# Patient Record
Sex: MALE | Race: OTHER | URBAN - METROPOLITAN AREA
[De-identification: names, ages, dates, MRNs, and addresses within clinical notes are randomized per-mention and may not be internally consistent; named-entity substitution may affect disease eponyms.]

---

## 2017-03-02 ENCOUNTER — EMERGENCY (EMERGENCY)
Facility: HOSPITAL | Age: 32
LOS: 0 days | Discharge: HOME | End: 2017-03-03
Attending: EMERGENCY MEDICINE | Admitting: EMERGENCY MEDICINE
Payer: MEDICAID

## 2017-03-02 VITALS
WEIGHT: 149.91 LBS | HEIGHT: 66 IN | HEART RATE: 61 BPM | SYSTOLIC BLOOD PRESSURE: 133 MMHG | OXYGEN SATURATION: 100 % | TEMPERATURE: 98 F | RESPIRATION RATE: 15 BRPM | DIASTOLIC BLOOD PRESSURE: 97 MMHG

## 2017-03-02 LAB
ALBUMIN SERPL ELPH-MCNC: 4 G/DL — SIGNIFICANT CHANGE UP (ref 3.3–5)
ALP SERPL-CCNC: 101 U/L — SIGNIFICANT CHANGE UP (ref 40–120)
ALT FLD-CCNC: 48 U/L — SIGNIFICANT CHANGE UP (ref 12–78)
ANION GAP SERPL CALC-SCNC: 7 MMOL/L — SIGNIFICANT CHANGE UP (ref 5–17)
APTT BLD: 29.9 SEC — SIGNIFICANT CHANGE UP (ref 27.5–37.4)
AST SERPL-CCNC: 21 U/L — SIGNIFICANT CHANGE UP (ref 15–37)
BASOPHILS # BLD AUTO: 0.1 K/UL — SIGNIFICANT CHANGE UP (ref 0–0.2)
BASOPHILS NFR BLD AUTO: 0.8 % — SIGNIFICANT CHANGE UP (ref 0–2)
BILIRUB SERPL-MCNC: 0.3 MG/DL — SIGNIFICANT CHANGE UP (ref 0.2–1.2)
BUN SERPL-MCNC: 15 MG/DL — SIGNIFICANT CHANGE UP (ref 7–23)
CALCIUM SERPL-MCNC: 9 MG/DL — SIGNIFICANT CHANGE UP (ref 8.5–10.1)
CHLORIDE SERPL-SCNC: 107 MMOL/L — SIGNIFICANT CHANGE UP (ref 96–108)
CO2 SERPL-SCNC: 28 MMOL/L — SIGNIFICANT CHANGE UP (ref 22–31)
CREAT SERPL-MCNC: 0.94 MG/DL — SIGNIFICANT CHANGE UP (ref 0.5–1.3)
D DIMER BLD IA.RAPID-MCNC: <150 NG/ML DDU — SIGNIFICANT CHANGE UP
EOSINOPHIL # BLD AUTO: 0.2 K/UL — SIGNIFICANT CHANGE UP (ref 0–0.5)
EOSINOPHIL NFR BLD AUTO: 1.7 % — SIGNIFICANT CHANGE UP (ref 0–6)
GLUCOSE SERPL-MCNC: 100 MG/DL — HIGH (ref 70–99)
HCT VFR BLD CALC: 48 % — SIGNIFICANT CHANGE UP (ref 39–50)
HGB BLD-MCNC: 15.9 G/DL — SIGNIFICANT CHANGE UP (ref 13–17)
INR BLD: 1.1 RATIO — SIGNIFICANT CHANGE UP (ref 0.88–1.16)
LIDOCAIN IGE QN: 273 U/L — SIGNIFICANT CHANGE UP (ref 73–393)
LYMPHOCYTES # BLD AUTO: 36.6 % — SIGNIFICANT CHANGE UP (ref 13–44)
LYMPHOCYTES # BLD AUTO: 4.1 K/UL — HIGH (ref 1–3.3)
MCHC RBC-ENTMCNC: 29.1 PG — SIGNIFICANT CHANGE UP (ref 27–34)
MCHC RBC-ENTMCNC: 33.2 GM/DL — SIGNIFICANT CHANGE UP (ref 32–36)
MCV RBC AUTO: 87.6 FL — SIGNIFICANT CHANGE UP (ref 80–100)
MONOCYTES # BLD AUTO: 1 K/UL — HIGH (ref 0–0.9)
MONOCYTES NFR BLD AUTO: 9.3 % — SIGNIFICANT CHANGE UP (ref 2–14)
NEUTROPHILS # BLD AUTO: 5.7 K/UL — SIGNIFICANT CHANGE UP (ref 1.8–7.4)
NEUTROPHILS NFR BLD AUTO: 51.5 % — SIGNIFICANT CHANGE UP (ref 43–77)
PLATELET # BLD AUTO: 319 K/UL — SIGNIFICANT CHANGE UP (ref 150–400)
POTASSIUM SERPL-MCNC: 3.9 MMOL/L — SIGNIFICANT CHANGE UP (ref 3.5–5.3)
POTASSIUM SERPL-SCNC: 3.9 MMOL/L — SIGNIFICANT CHANGE UP (ref 3.5–5.3)
PROT SERPL-MCNC: 7.7 GM/DL — SIGNIFICANT CHANGE UP (ref 6–8.3)
PROTHROM AB SERPL-ACNC: 12.1 SEC — SIGNIFICANT CHANGE UP (ref 10–13.1)
RBC # BLD: 5.48 M/UL — SIGNIFICANT CHANGE UP (ref 4.2–5.8)
RBC # FLD: 12.3 % — SIGNIFICANT CHANGE UP (ref 10.3–14.5)
SODIUM SERPL-SCNC: 142 MMOL/L — SIGNIFICANT CHANGE UP (ref 135–145)
TROPONIN I SERPL-MCNC: <0.015 NG/ML — SIGNIFICANT CHANGE UP (ref 0.01–0.04)
WBC # BLD: 11.1 K/UL — HIGH (ref 3.8–10.5)
WBC # FLD AUTO: 11.1 K/UL — HIGH (ref 3.8–10.5)

## 2017-03-02 PROCEDURE — 99284 EMERGENCY DEPT VISIT MOD MDM: CPT

## 2017-03-02 PROCEDURE — 71020: CPT | Mod: 26

## 2017-03-02 PROCEDURE — 93010 ELECTROCARDIOGRAM REPORT: CPT

## 2017-03-02 RX ORDER — SODIUM CHLORIDE 9 MG/ML
3 INJECTION INTRAMUSCULAR; INTRAVENOUS; SUBCUTANEOUS ONCE
Qty: 0 | Refills: 0 | Status: COMPLETED | OUTPATIENT
Start: 2017-03-02 | End: 2017-03-02

## 2017-03-02 RX ADMIN — SODIUM CHLORIDE 3 MILLILITER(S): 9 INJECTION INTRAMUSCULAR; INTRAVENOUS; SUBCUTANEOUS at 23:57

## 2017-03-02 NOTE — ED PROVIDER NOTE - OBJECTIVE STATEMENT
32 yo M with hx of HTN presents for eval of right sided chest pain x 3 days. pain poorly characterized, intermittent, associated with shortness of breath, not associated with vomitng or diaphoresis. pain not worsened by food, not associated with food.

## 2017-03-02 NOTE — ED ADULT TRIAGE NOTE - CHIEF COMPLAINT QUOTE
brathing problems x 2 days. Denies chest pain. Denies significant med hx. no OTC meds tried at home. Has not seen MD. no acute distress noted at triage desk.

## 2017-03-02 NOTE — ED ADULT NURSE NOTE - CHPI ED SYMPTOMS NEG
no dizziness/no fever/no vomiting/no nausea/no tingling/no weakness/no chills/no decreased eating/drinking

## 2017-03-03 VITALS
SYSTOLIC BLOOD PRESSURE: 125 MMHG | HEART RATE: 71 BPM | DIASTOLIC BLOOD PRESSURE: 73 MMHG | TEMPERATURE: 98 F | OXYGEN SATURATION: 100 % | RESPIRATION RATE: 17 BRPM

## 2017-03-03 PROCEDURE — 76705 ECHO EXAM OF ABDOMEN: CPT | Mod: 26

## 2017-03-04 DIAGNOSIS — I10 ESSENTIAL (PRIMARY) HYPERTENSION: ICD-10-CM

## 2017-03-04 DIAGNOSIS — R07.89 OTHER CHEST PAIN: ICD-10-CM

## 2022-03-13 ENCOUNTER — EMERGENCY (EMERGENCY)
Facility: HOSPITAL | Age: 37
LOS: 0 days | Discharge: HOME | End: 2022-03-13
Attending: EMERGENCY MEDICINE
Payer: MEDICAID

## 2022-03-13 VITALS — HEIGHT: 66 IN

## 2022-03-13 VITALS
RESPIRATION RATE: 18 BRPM | HEART RATE: 84 BPM | DIASTOLIC BLOOD PRESSURE: 95 MMHG | OXYGEN SATURATION: 99 % | TEMPERATURE: 98 F | SYSTOLIC BLOOD PRESSURE: 148 MMHG

## 2022-03-13 DIAGNOSIS — R20.2 PARESTHESIA OF SKIN: ICD-10-CM

## 2022-03-13 DIAGNOSIS — K45.8 OTHER SPECIFIED ABDOMINAL HERNIA WITHOUT OBSTRUCTION OR GANGRENE: ICD-10-CM

## 2022-03-13 DIAGNOSIS — M54.10 RADICULOPATHY, SITE UNSPECIFIED: ICD-10-CM

## 2022-03-13 DIAGNOSIS — M79.605 PAIN IN LEFT LEG: ICD-10-CM

## 2022-03-13 DIAGNOSIS — M54.2 CERVICALGIA: ICD-10-CM

## 2022-03-13 DIAGNOSIS — I10 ESSENTIAL (PRIMARY) HYPERTENSION: ICD-10-CM

## 2022-03-13 PROCEDURE — 93971 EXTREMITY STUDY: CPT | Mod: 26,LT

## 2022-03-13 PROCEDURE — 93971 EXTREMITY STUDY: CPT | Mod: LT

## 2022-03-13 PROCEDURE — 72131 CT LUMBAR SPINE W/O DYE: CPT | Mod: 26,MA

## 2022-03-13 PROCEDURE — 72131 CT LUMBAR SPINE W/O DYE: CPT | Mod: MA

## 2022-03-13 PROCEDURE — 96372 THER/PROPH/DIAG INJ SC/IM: CPT

## 2022-03-13 PROCEDURE — 99285 EMERGENCY DEPT VISIT HI MDM: CPT

## 2022-03-13 PROCEDURE — 99284 EMERGENCY DEPT VISIT MOD MDM: CPT | Mod: 25

## 2022-03-13 RX ORDER — KETOROLAC TROMETHAMINE 30 MG/ML
30 SYRINGE (ML) INJECTION ONCE
Refills: 0 | Status: DISCONTINUED | OUTPATIENT
Start: 2022-03-13 | End: 2022-03-13

## 2022-03-13 RX ORDER — SODIUM CHLORIDE 9 MG/ML
1000 INJECTION INTRAMUSCULAR; INTRAVENOUS; SUBCUTANEOUS ONCE
Refills: 0 | Status: DISCONTINUED | OUTPATIENT
Start: 2022-03-13 | End: 2022-03-13

## 2022-03-13 RX ADMIN — Medication 30 MILLIGRAM(S): at 11:10

## 2022-03-13 NOTE — ED STATDOCS - NSFOLLOWUPINSTRUCTIONS_ED_ALL_ED_FT
Follow up with your primary care doctor and with a spine doctor for further evaluation of the pain.  Continue the pain medication that your doctor gave you.  Avoid heavy lifting or bending.     Return to the Er for any new or other concerns.

## 2022-03-13 NOTE — ED STATDOCS - PROGRESS NOTE DETAILS
35 yo male with a no significant PMH presents with LLE pain. Pt states approx 1 week ago he noticed R sided neck pain and RLE pain, was seen by his PMD, and was prescribed methocarbamol and naproxen which helped. The RLE pain resolved and then the pain traveled to the LLE. Feels the pain from the L thigh to the L calf and described as tingling. Denies fever, back pain, injuries or trauma. Works as a . Will check sono LLE and CT L spine. -Alexx Gomez PA-C Ct showing mild herniation on the R L5 nerve root and unremakrable sono. Pt feels better after the toradol. Advised him to continue the naproxen and muscle relaxer that he was given and to f/u with pmd and will give spine f/u. -Alexx Gomez PA-C

## 2022-03-13 NOTE — ED STATDOCS - PATIENT PORTAL LINK FT
You can access the FollowMyHealth Patient Portal offered by [facility] by registering at the following website: [email or web address]. By joining [employer]’s FollowMyHealth portal, you will also be able to view your health information using other applications (apps) compatible with our system.

## 2022-03-13 NOTE — ED STATDOCS - CARE PROVIDER_API CALL
Hernán Raya; PhD)  Neurosurgery  [address], [address] [place] [address]  Phone: [phone]  Fax: [phone]  Follow Up Time:

## 2022-03-13 NOTE — ED ADULT NURSE NOTE - CAS ELECT INFOMATION PROVIDED
pt discharged, results explained by MIGEL Gomez, refused [place]  for discharge, but it was offered./DC instructions

## 2022-03-13 NOTE — ED STATDOCS - NEUROLOGICAL, MLM
LLE with normal sensory and motor exam, no deficits; patellar, achilles reflexes 2+, Babinski negative.

## 2022-03-13 NOTE — ED STATDOCS - CLINICAL SUMMARY MEDICAL DECISION MAKING FREE TEXT BOX
37 yo male presents with LLE pain described as tingling. Not improving with muscle relaxers and nsaids. WIll check sono and CT and reeval. -Alexx Gomez PA-C

## 2022-03-13 NOTE — ED STATDOCS - OBJECTIVE STATEMENT
37 yo M no significant PMHx presents with CC of left leg pain, paresthesias.  Symptoms x 2 days.  C/o stinging pain, tingling sensation from left buttock down left leg.  Denies weakness, difficulty walking.  States he had been having right neck pain a few days ago and was treated with NSAID and muscle relaxer.  those symptoms improved but now having new LLE symptoms.  Denies trauma, back pain, incontinence, or any other symptoms.  No other concerns.

## 2022-07-28 NOTE — ED ADULT TRIAGE NOTE - AS HEIGHT TYPE
Rapid3 Question Responses and Scores 7/27/2022   MDHAQ Score 0.2   Psychologic Score 0   Pain Score 2.5   When you awakened in the morning OVER THE LAST WEEK, did you feel stiff? No   If Yes, please indicate the number of hours until you are as limber as you will be for the day 1   Fatigue Score 2   Global Health Score 3   RAPID3 Score 2.06       Answers for HPI/ROS submitted by the patient on 7/27/2022  fever: No  eye redness: Yes  mouth sores: No  headaches: No  shortness of breath: No  chest pain: No  trouble swallowing: No  diarrhea: No  constipation: No  unexpected weight change: No  genital sore: No  dysuria: No  During the last 3 days, have you had a skin rash?: No  Bruises or bleeds easily: No  cough: No       stated

## 2024-02-20 ENCOUNTER — EMERGENCY (EMERGENCY)
Facility: HOSPITAL | Age: 39
LOS: 0 days | Discharge: HOME | End: 2024-02-20
Attending: STUDENT IN AN ORGANIZED HEALTH CARE EDUCATION/TRAINING PROGRAM
Payer: MEDICAID

## 2024-02-20 VITALS — WEIGHT: 184.09 LBS | HEIGHT: 68 IN

## 2024-02-20 VITALS
OXYGEN SATURATION: 100 % | HEART RATE: 80 BPM | DIASTOLIC BLOOD PRESSURE: 82 MMHG | RESPIRATION RATE: 16 BRPM | SYSTOLIC BLOOD PRESSURE: 126 MMHG | TEMPERATURE: 98 F

## 2024-02-20 DIAGNOSIS — Z91.148 PATIENT'S OTHER NONCOMPLIANCE WITH MEDICATION REGIMEN FOR OTHER REASON: ICD-10-CM

## 2024-02-20 DIAGNOSIS — Z79.84 LONG TERM (CURRENT) USE OF ORAL HYPOGLYCEMIC DRUGS: ICD-10-CM

## 2024-02-20 DIAGNOSIS — R10.9 UNSPECIFIED ABDOMINAL PAIN: ICD-10-CM

## 2024-02-20 DIAGNOSIS — E11.65 TYPE 2 DIABETES MELLITUS WITH HYPERGLYCEMIA: ICD-10-CM

## 2024-02-20 DIAGNOSIS — R53.83 OTHER FATIGUE: ICD-10-CM

## 2024-02-20 DIAGNOSIS — I10 ESSENTIAL (PRIMARY) HYPERTENSION: ICD-10-CM

## 2024-02-20 DIAGNOSIS — R73.9 HYPERGLYCEMIA, UNSPECIFIED: ICD-10-CM

## 2024-02-20 LAB
ACETONE SERPL-MCNC: NEGATIVE — SIGNIFICANT CHANGE UP
ALBUMIN SERPL ELPH-MCNC: 3.7 G/DL — SIGNIFICANT CHANGE UP (ref 3.3–5)
ALP SERPL-CCNC: 109 U/L — SIGNIFICANT CHANGE UP (ref 40–120)
ALT FLD-CCNC: 86 U/L — HIGH (ref 12–78)
ANION GAP SERPL CALC-SCNC: 6 MMOL/L — SIGNIFICANT CHANGE UP (ref 5–17)
APPEARANCE UR: CLEAR — SIGNIFICANT CHANGE UP
AST SERPL-CCNC: 38 U/L — HIGH (ref 15–37)
BASE EXCESS BLDV CALC-SCNC: -0.3 MMOL/L — SIGNIFICANT CHANGE UP (ref -2–3)
BASOPHILS # BLD AUTO: 0.05 K/UL — SIGNIFICANT CHANGE UP (ref 0–0.2)
BASOPHILS NFR BLD AUTO: 0.6 % — SIGNIFICANT CHANGE UP (ref 0–2)
BILIRUB SERPL-MCNC: 0.4 MG/DL — SIGNIFICANT CHANGE UP (ref 0.2–1.2)
BILIRUB UR-MCNC: NEGATIVE — SIGNIFICANT CHANGE UP
BUN SERPL-MCNC: 14 MG/DL — SIGNIFICANT CHANGE UP (ref 7–23)
CALCIUM SERPL-MCNC: 9.5 MG/DL — SIGNIFICANT CHANGE UP (ref 8.5–10.1)
CHLORIDE SERPL-SCNC: 106 MMOL/L — SIGNIFICANT CHANGE UP (ref 96–108)
CO2 SERPL-SCNC: 28 MMOL/L — SIGNIFICANT CHANGE UP (ref 22–31)
COLOR SPEC: YELLOW — SIGNIFICANT CHANGE UP
CREAT SERPL-MCNC: 1.4 MG/DL — HIGH (ref 0.5–1.3)
DIFF PNL FLD: NEGATIVE — SIGNIFICANT CHANGE UP
EGFR: 65 ML/MIN/1.73M2 — SIGNIFICANT CHANGE UP
EOSINOPHIL # BLD AUTO: 0.14 K/UL — SIGNIFICANT CHANGE UP (ref 0–0.5)
EOSINOPHIL NFR BLD AUTO: 1.6 % — SIGNIFICANT CHANGE UP (ref 0–6)
GAS PNL BLDV: SIGNIFICANT CHANGE UP
GLUCOSE BLDC GLUCOMTR-MCNC: 261 MG/DL — HIGH (ref 70–99)
GLUCOSE BLDC GLUCOMTR-MCNC: 307 MG/DL — HIGH (ref 70–99)
GLUCOSE SERPL-MCNC: 429 MG/DL — HIGH (ref 70–99)
GLUCOSE UR QL: >=1000 MG/DL
HCO3 BLDV-SCNC: 24 MMOL/L — SIGNIFICANT CHANGE UP (ref 22–29)
HCT VFR BLD CALC: 46.9 % — SIGNIFICANT CHANGE UP (ref 39–50)
HGB BLD-MCNC: 15.8 G/DL — SIGNIFICANT CHANGE UP (ref 13–17)
IMM GRANULOCYTES NFR BLD AUTO: 0.2 % — SIGNIFICANT CHANGE UP (ref 0–0.9)
KETONES UR-MCNC: ABNORMAL MG/DL
LEUKOCYTE ESTERASE UR-ACNC: NEGATIVE — SIGNIFICANT CHANGE UP
LIDOCAIN IGE QN: 65 U/L — SIGNIFICANT CHANGE UP (ref 13–75)
LYMPHOCYTES # BLD AUTO: 2.75 K/UL — SIGNIFICANT CHANGE UP (ref 1–3.3)
LYMPHOCYTES # BLD AUTO: 31.1 % — SIGNIFICANT CHANGE UP (ref 13–44)
MAGNESIUM SERPL-MCNC: 2.3 MG/DL — SIGNIFICANT CHANGE UP (ref 1.6–2.6)
MCHC RBC-ENTMCNC: 29.3 PG — SIGNIFICANT CHANGE UP (ref 27–34)
MCHC RBC-ENTMCNC: 33.7 GM/DL — SIGNIFICANT CHANGE UP (ref 32–36)
MCV RBC AUTO: 87 FL — SIGNIFICANT CHANGE UP (ref 80–100)
MONOCYTES # BLD AUTO: 0.71 K/UL — SIGNIFICANT CHANGE UP (ref 0–0.9)
MONOCYTES NFR BLD AUTO: 8 % — SIGNIFICANT CHANGE UP (ref 2–14)
NEUTROPHILS # BLD AUTO: 5.17 K/UL — SIGNIFICANT CHANGE UP (ref 1.8–7.4)
NEUTROPHILS NFR BLD AUTO: 58.5 % — SIGNIFICANT CHANGE UP (ref 43–77)
NITRITE UR-MCNC: NEGATIVE — SIGNIFICANT CHANGE UP
PCO2 BLDV: 38 MMHG — LOW (ref 42–55)
PH BLDV: 7.41 — SIGNIFICANT CHANGE UP (ref 7.32–7.43)
PH UR: 7 — SIGNIFICANT CHANGE UP (ref 5–8)
PHOSPHATE SERPL-MCNC: 2.9 MG/DL — SIGNIFICANT CHANGE UP (ref 2.5–4.5)
PLATELET # BLD AUTO: 283 K/UL — SIGNIFICANT CHANGE UP (ref 150–400)
PO2 BLDV: 172 MMHG — HIGH (ref 25–45)
POTASSIUM SERPL-MCNC: 4.6 MMOL/L — SIGNIFICANT CHANGE UP (ref 3.5–5.3)
POTASSIUM SERPL-SCNC: 4.6 MMOL/L — SIGNIFICANT CHANGE UP (ref 3.5–5.3)
PROT SERPL-MCNC: 7.3 GM/DL — SIGNIFICANT CHANGE UP (ref 6–8.3)
PROT UR-MCNC: NEGATIVE MG/DL — SIGNIFICANT CHANGE UP
RBC # BLD: 5.39 M/UL — SIGNIFICANT CHANGE UP (ref 4.2–5.8)
RBC # FLD: 12.7 % — SIGNIFICANT CHANGE UP (ref 10.3–14.5)
SAO2 % BLDV: 100 % — HIGH (ref 67–88)
SODIUM SERPL-SCNC: 140 MMOL/L — SIGNIFICANT CHANGE UP (ref 135–145)
SP GR SPEC: >1.03 — HIGH (ref 1–1.03)
TROPONIN I, HIGH SENSITIVITY RESULT: 4.8 NG/L — SIGNIFICANT CHANGE UP
UROBILINOGEN FLD QL: 0.2 MG/DL — SIGNIFICANT CHANGE UP (ref 0.2–1)
WBC # BLD: 8.84 K/UL — SIGNIFICANT CHANGE UP (ref 3.8–10.5)
WBC # FLD AUTO: 8.84 K/UL — SIGNIFICANT CHANGE UP (ref 3.8–10.5)

## 2024-02-20 PROCEDURE — 85025 COMPLETE CBC W/AUTO DIFF WBC: CPT

## 2024-02-20 PROCEDURE — 99284 EMERGENCY DEPT VISIT MOD MDM: CPT

## 2024-02-20 PROCEDURE — 82009 KETONE BODYS QUAL: CPT

## 2024-02-20 PROCEDURE — 81003 URINALYSIS AUTO W/O SCOPE: CPT

## 2024-02-20 PROCEDURE — 99284 EMERGENCY DEPT VISIT MOD MDM: CPT | Mod: 25

## 2024-02-20 PROCEDURE — 71045 X-RAY EXAM CHEST 1 VIEW: CPT

## 2024-02-20 PROCEDURE — 82962 GLUCOSE BLOOD TEST: CPT

## 2024-02-20 PROCEDURE — 84484 ASSAY OF TROPONIN QUANT: CPT

## 2024-02-20 PROCEDURE — 82803 BLOOD GASES ANY COMBINATION: CPT

## 2024-02-20 PROCEDURE — 36415 COLL VENOUS BLD VENIPUNCTURE: CPT

## 2024-02-20 PROCEDURE — 71045 X-RAY EXAM CHEST 1 VIEW: CPT | Mod: 26

## 2024-02-20 PROCEDURE — 83735 ASSAY OF MAGNESIUM: CPT

## 2024-02-20 PROCEDURE — 83690 ASSAY OF LIPASE: CPT

## 2024-02-20 PROCEDURE — 84100 ASSAY OF PHOSPHORUS: CPT

## 2024-02-20 PROCEDURE — 80053 COMPREHEN METABOLIC PANEL: CPT

## 2024-02-20 RX ORDER — SODIUM CHLORIDE 9 MG/ML
2000 INJECTION, SOLUTION INTRAVENOUS ONCE
Refills: 0 | Status: COMPLETED | OUTPATIENT
Start: 2024-02-20 | End: 2024-02-20

## 2024-02-20 RX ORDER — INSULIN HUMAN 100 [IU]/ML
6 INJECTION, SOLUTION SUBCUTANEOUS ONCE
Refills: 0 | Status: COMPLETED | OUTPATIENT
Start: 2024-02-20 | End: 2024-02-20

## 2024-02-20 RX ADMIN — SODIUM CHLORIDE 2000 MILLILITER(S): 9 INJECTION, SOLUTION INTRAVENOUS at 13:42

## 2024-02-20 RX ADMIN — INSULIN HUMAN 6 UNIT(S): 100 INJECTION, SOLUTION SUBCUTANEOUS at 14:56

## 2024-02-20 NOTE — ED STATDOCS - OBJECTIVE STATEMENT
39 yo male w/PMHx HTN and DM on Metformin 100mg twice a day presents to the ED c/o abdominal pain, fatigue, hyperglycemia, and abnormal labs done out pt (with reported A1C of 11.1 and BGL of 652). Pt states he is having abdominal pain after eating and urinary frequency. Pt had not been taking his Metformin for the last 3 months, just restarted taking it.  used, id#[id number]

## 2024-02-20 NOTE — ED ADULT NURSE NOTE - NSFALLUNIVINTERV_ED_ALL_ED
Bed/Stretcher in lowest position, wheels locked, appropriate side rails in place/Call bell, personal items and telephone in reach/Instruct patient to call for assistance before getting out of bed/chair/stretcher/Non-slip footwear applied when patient is off stretcher/[place] to call system/Physically safe environment - no spills, clutter or unnecessary equipment/Purposeful proactive rounding/Room/bathroom lighting operational, light cord in reach

## 2024-02-20 NOTE — ED ADULT TRIAGE NOTE - CHIEF COMPLAINT QUOTE
pt presents to Ed with complaints of abdominal pain, fatigue, and abnormal glucose of recent labs. pt had A1C of 11.1 and Glucose of 652 on labs from 2/19/2024. pt sent to Ed from [facility]. Marva in triage 378.

## 2024-02-20 NOTE — ED STATDOCS - PHYSICAL EXAMINATION
GENERAL: A&Ox4, non-toxic appearing, no acute distress  HEENT: NCAT, EOMI, oral mucosa moist, normal conjunctiva  RESP: no respiratory distress, speaking in full sentences  CV: RRR  MSK: no visible deformities  NEURO: no focal sensory or motor deficits, CN 2-12 grossly intact  SKIN: warm, normal color, well perfused, no rash  PSYCH: normal affect

## 2024-02-20 NOTE — ED STATDOCS - PROGRESS NOTE DETAILS
Using SI, 39 yo male with a PMH of htn, dm presents with hyperglycemia. Pt was seen by his PMD who noticed his sugar was 652 in the office after blood work was taken. Pt states he has not taken his metformin in 3 months and just started retaking them. Associated abd pain and fatigue. Will check labs, meds, and reeval. -Alexx Gomez PA-C Using SI [id number], discussed results with pt. Glucose had improved to 261. Pt is feeling better. Advised pt to continue to take his medication as directed and gave strict return precautions. -Alexx Gomez PA-C

## 2024-02-20 NOTE — ED ADULT NURSE NOTE - OBJECTIVE STATEMENT
Pt presents to ED sent in by MD for abnormal labs, glucose of 652 and A1C of 11.1 on outpatient labs. Pt endorses abdominal pain and urinary frequency. Pt has hx of DM on metformin, has not taken it in 3 months.

## 2024-02-20 NOTE — ED STATDOCS - CLINICAL SUMMARY MEDICAL DECISION MAKING FREE TEXT BOX
38-year-old male noncompliant with metformin presents for elevated sugars and A1c.  Having urinary frequency likely secondary to glucosuria.  Does not appear toxic, unlikely to be DKA but will rule out with blood work, chest x-ray, give IV fluids, anticipate discharge with endocrinology follow-up.

## 2024-02-20 NOTE — ED STATDOCS - NSFOLLOWUPINSTRUCTIONS_ED_ALL_ED_FT
Hiperglucemia  Hyperglycemia  La hiperglucemia se produce cuando el nivel de azúcar (glucosa) en la jerrica es demasiado alto. La glucosa es un tipo de azúcar que constituye la principal ivana de energía del cuerpo. Determinadas hormonas, tyler la insulina y el glucagón, controlan el nivel de glucosa en la jerrica. La insulina baja la glucemia, mientras que el glucagón la aumenta. La hiperglucemia puede surgir por no tener will cantidad suficiente de insulina en el torrente sanguíneo o cuando el cuerpo no responde de forma normal a la insulina.    La mayoría de las veces, aparece en personas que tienen diabetes (diabetes mellitus), aunque puede manifestarse en personas que no padecen esta enfermedad. Se puede desarrollar rápidamente y, cuando provoca deshidratación aguda (cetoacidosis diabética o estado hiperosmolar hiperglucémico), puede ser potencialmente mortal. La hiperglucemia es will emergencia médica.    En la mayoría de las personas con diabetes, un nivel de glucemia superior a 240 mg/dl se considera hiperglucemia.    ¿Cuáles son las causas?  Si tiene diabetes, las causas de la hiperglucemia pueden ser las siguientes:  Medicamentos que aumentan la glucemia o que afectan el control de la diabetes.  Disminuir la actividad física.  Boles más de lo previsto.  Estar enfermo o lesionado, tener will infección o someterse a will cirugía.  Estrés.  No administrarse la insulina suficiente (si está recibiendo insulina).  Si tiene diabetes sin diagnosticar, erick puede ser la razón por la que tiene hiperglucemia.    Si no tiene diabetes, las causas de la hiperglucemia pueden ser las siguientes:  Ciertos medicamentos, tyler los siguientes:  Medicamentos con corticoesteroides.  Betabloqueantes.  Epinefrina.  Diuréticos tiazídicos.  Estrés.  Tener will enfermedad grave, will infección o someterse a will cirugía.  Enfermedades del páncreas.  ¿Qué incrementa el riesgo?  Es más probable que will persona con factores de riesgo de diabetes, tenga hiperglucemia; por ejemplo:  Tener un familiar con diabetes.  Determinadas afecciones en las que el sistema del cuerpo encargado de combatir las enfermedades (sistema inmunitario) se ataca a sí mismo (trastornos autoinmunitarios).  Tener sobrepeso u obesidad.  Tener un estilo de myah inactivo (sedentario).  Karyna sido diagnosticado con resistencia a la insulina.  Tener antecedentes de prediabetes, diabetes gestacional o síndrome del ovario poliquístico (SOP).  ¿Cuáles son los signos o síntomas?  Es posible que la hiperglucemia no cause ningún síntoma. Si tiene síntomas, pueden incluir los siguientes:  Aumento de la sed.  Necesidad de orinar con mayor frecuencia que lo habitual.  Hambre.  Mucho cansancio.  Visión borrosa.  Cuando la hiperglucemia empeora, pueden aparecer otros síntomas, tyler los siguientes:  Sequedad en la boca.  Dolor abdominal.  Pérdida del apetito.  Aliento con olor a fruta.  Debilidad.  Pérdida de peso no planificada.  Hormigueo o adormecimiento de las teo o los pies.  Dolor de christophe.  Jeong o moretones que tardan en sanar.  ¿Cómo se diagnostica?  La hiperglucemia se diagnostica con un análisis de jerrica para medir el nivel de glucemia. Jayde análisis de jerrica se realiza normalmente cuando tiene los síntomas. El médico también puede hacerle un examen físico y revisar ysiel antecedentes médicos.    Es posible que deba hacerse otros estudios para determinar la causa de campos hiperglucemia, tyler, por ejemplo:  Prueba de la glucemia en ayunas. No se le permitirá comer (tendrá que hacer ayuno) por al menos 8 horas antes de que se le tome will muestra de jerrica.  Prueba de A1C en la jerrica. Esta prueba proporciona información sobre el control de la glucemia shanice los últimos 2 o 3 meses.  Prueba de tolerancia a la glucosa oral (PTGO). Esta prueba mide la glucemia en dos momentos:  Después de ayunar. Jayde es el valor inicial de la glucemia.  Dos horas después de tristan will bebida que contiene glucosa.  ¿Cómo se trata?  El tratamiento depende de la causa de la hiperglucemia. El tratamiento puede incluir:  Medicamentos para regular los niveles de glucemia. Si recibe insulina u otro medicamento para la diabetes, es posible que se deba corregir la dosis o el medicamento.  Cambios en el estilo de myah, tyler hacer más actividad física, comer alimentos más saludables o bajar de peso.  Tratar will enfermedad o afección.  Control de la glucemia con mayor frecuencia.  Suspender o reducir los medicamentos con corticoesteroides.  Si la hiperglucemia se agrava y ocasiona un estado hiperosmolar hiperglucémico o de cetoacidosis diabética, deberá ser hospitalizado y recibir líquidos e insulina por vía intravenosa (IV).    Siga estas instrucciones en campos casa:  Instrucciones generales    Use los medicamentos de venta marino y los recetados solamente tyler se lo haya indicado el médico.  No consuma ningún producto que contenga nicotina o tabaco. Estos productos incluyen cigarrillos, tabaco para mascar y aparatos de vapeo, tyler los cigarrillos electrónicos. Si necesita ayuda para dejar de fumar, consulte al médico.  Si lorraine alcohol:  Limite la cantidad que lorraine a lo siguiente:  De 0 a 1 medida por día para las mujeres que no están embarazadas.  De 0 a 2 medidas por día para los hombres.  Sepa cuánta cantidad de alcohol hay en las bebidas. En los [place], will medida equivale a will botella de cerveza de 12 oz (355 ml), un vaso de vino de 5 oz (148 ml) o un vaso de will bebida alcohólica de thang graduación de 1½ oz (44 ml).  Aprenda a manejar el estrés. Si necesita ayuda para lograrlo, consulte a campos médico.  Clifford ejercicio tyler se lo haya indicado el médico.  Cumpla con todas las visitas de seguimiento. [place] es importante.  Comida y bebida      Mantenga un peso saludable.  Manténgase hidratado, especialmente cuando clifford actividad física, esté enfermo o pase tiempo en temperaturas elevadas.  Aga suficiente líquido tyler para mantener la orina de color amarillo pálido.  Si usted tiene diabetes:      Conozca los síntomas de la hiperglucemia.  Siga el plan de control de la diabetes tyler se lo haya indicado el médico. Asegúrese de hacer lo siguiente:  Aplíquese la insulina y tome los medicamentos tyler se lo hayan indicado.  Siga campos plan de ejercicio.  Siga campos plan de comidas. Coma a horario y no se saltee ninguna comida.  Controle campos nivel de glucemia con la frecuencia que le hayan indicado. Contrólese la glucemia antes y después de hacer actividad física. Si hace ejercicio shanice más tiempo o de manera diferente, contrólese la glucemia con mayor frecuencia.  Siga el plan para los días de enfermedad cuando no pueda comer ni beber normalmente. Elabore jayde plan por adelantado con el médico.  Comparta campos plan de control de la diabetes con yisel compañeros de trabajo y de la escuela, y con las personas con las que convive.  Contrólese las cetonas en la orina cuando esté enferma y tyler se lo haya indicado el médico.  Lleve will tarjeta de alerta médica o use un brazalete o medalla de alerta médica.  Dónde buscar más información  American Diabetes Association (Asociación [place] de la Diabetes): www.diabetes.org    Comuníquese con un médico si:  El nivel de glucemia es mayor o igual que 240 mg/dl (13,3 mmol/dl) shanice 2 días seguidos.  Tiene problemas para mantener la glucemia dentro del rango ideal.  Tiene episodios frecuentes de hiperglucemia.  Tiene signos de enfermedad, tyler náuseas, vómitos o fiebre.  Solicite ayuda de inmediato si:  El monitor indica un nivel “alto” de glucemia incluso cuando se está administrando insulina.  Tiene dificultad para respirar.  Tiene cambios en la manera de sentirse, pensar o actuar (estado mental).  Tiene náuseas o vómitos que no desaparecen.  Estos síntomas pueden representar un problema grave que constituye will emergencia. No espere a nery si los síntomas desaparecen. Solicite atención médica de inmediato. Comuníquese con el servicio de emergencias de campos localidad (911 en los [place]). No conduzca por yisel propios medios hasta el hospital.    Resumen  La hiperglucemia se produce cuando el nivel de azúcar (glucosa) en la jerrica es demasiado alto.  La hiperglucemia puede presentarse con o sin diabetes, y la hiperglucemia grave puede ser potencialmente mortal.  La hiperglucemia se diagnostica con un análisis de jerrica para medir el nivel de glucemia. Jayde análisis de jerrica se realiza normalmente cuando tiene los síntomas. El médico también puede hacerle un examen físico y revisar yisel antecedentes médicos.  Si tiene diabetes, siga el plan de control de la diabetes tyler se lo haya indicado el médico.  Comuníquese con el médico si tiene problemas para mantener la glucemia dentro del intervalo deseado.

## 2024-02-20 NOTE — ED STATDOCS - PATIENT PORTAL LINK FT
You can access the FollowMyHealth Patient Portal offered by [facility] by registering at the following website: [email or web address]. By joining [employer]’s FollowMyHealth portal, you will also be able to view your health information using other applications (apps) compatible with our system.

## 2024-04-11 ENCOUNTER — EMERGENCY (EMERGENCY)
Facility: HOSPITAL | Age: 39
LOS: 0 days | Discharge: HOME | End: 2024-04-12
Attending: EMERGENCY MEDICINE
Payer: COMMERCIAL

## 2024-04-11 VITALS — HEIGHT: 68 IN | WEIGHT: 179.9 LBS

## 2024-04-11 DIAGNOSIS — E11.9 TYPE 2 DIABETES MELLITUS WITHOUT COMPLICATIONS: ICD-10-CM

## 2024-04-11 DIAGNOSIS — R51.9 HEADACHE, UNSPECIFIED: ICD-10-CM

## 2024-04-11 DIAGNOSIS — I10 ESSENTIAL (PRIMARY) HYPERTENSION: ICD-10-CM

## 2024-04-11 DIAGNOSIS — G44.209 TENSION-TYPE HEADACHE, UNSPECIFIED, NOT INTRACTABLE: ICD-10-CM

## 2024-04-11 DIAGNOSIS — R53.81 OTHER MALAISE: ICD-10-CM

## 2024-04-11 LAB
ALBUMIN SERPL ELPH-MCNC: 3.7 G/DL — SIGNIFICANT CHANGE UP (ref 3.3–5)
ALP SERPL-CCNC: 87 U/L — SIGNIFICANT CHANGE UP (ref 40–120)
ALT FLD-CCNC: 47 U/L — SIGNIFICANT CHANGE UP (ref 12–78)
ANION GAP SERPL CALC-SCNC: 3 MMOL/L — LOW (ref 5–17)
AST SERPL-CCNC: 23 U/L — SIGNIFICANT CHANGE UP (ref 15–37)
BASOPHILS # BLD AUTO: 0.04 K/UL — SIGNIFICANT CHANGE UP (ref 0–0.2)
BASOPHILS NFR BLD AUTO: 0.4 % — SIGNIFICANT CHANGE UP (ref 0–2)
BILIRUB SERPL-MCNC: 0.3 MG/DL — SIGNIFICANT CHANGE UP (ref 0.2–1.2)
BUN SERPL-MCNC: 15 MG/DL — SIGNIFICANT CHANGE UP (ref 7–23)
CALCIUM SERPL-MCNC: 9.7 MG/DL — SIGNIFICANT CHANGE UP (ref 8.5–10.1)
CHLORIDE SERPL-SCNC: 110 MMOL/L — HIGH (ref 96–108)
CO2 SERPL-SCNC: 28 MMOL/L — SIGNIFICANT CHANGE UP (ref 22–31)
CREAT SERPL-MCNC: 1.1 MG/DL — SIGNIFICANT CHANGE UP (ref 0.5–1.3)
EGFR: 87 ML/MIN/1.73M2 — SIGNIFICANT CHANGE UP
EOSINOPHIL # BLD AUTO: 0.34 K/UL — SIGNIFICANT CHANGE UP (ref 0–0.5)
EOSINOPHIL NFR BLD AUTO: 3.3 % — SIGNIFICANT CHANGE UP (ref 0–6)
GLUCOSE SERPL-MCNC: 108 MG/DL — HIGH (ref 70–99)
HCT VFR BLD CALC: 43.4 % — SIGNIFICANT CHANGE UP (ref 39–50)
HGB BLD-MCNC: 14 G/DL — SIGNIFICANT CHANGE UP (ref 13–17)
IMM GRANULOCYTES NFR BLD AUTO: 0.3 % — SIGNIFICANT CHANGE UP (ref 0–0.9)
LYMPHOCYTES # BLD AUTO: 3.34 K/UL — HIGH (ref 1–3.3)
LYMPHOCYTES # BLD AUTO: 32.3 % — SIGNIFICANT CHANGE UP (ref 13–44)
MCHC RBC-ENTMCNC: 28.3 PG — SIGNIFICANT CHANGE UP (ref 27–34)
MCHC RBC-ENTMCNC: 32.3 GM/DL — SIGNIFICANT CHANGE UP (ref 32–36)
MCV RBC AUTO: 87.9 FL — SIGNIFICANT CHANGE UP (ref 80–100)
MONOCYTES # BLD AUTO: 1.04 K/UL — HIGH (ref 0–0.9)
MONOCYTES NFR BLD AUTO: 10 % — SIGNIFICANT CHANGE UP (ref 2–14)
NEUTROPHILS # BLD AUTO: 5.56 K/UL — SIGNIFICANT CHANGE UP (ref 1.8–7.4)
NEUTROPHILS NFR BLD AUTO: 53.7 % — SIGNIFICANT CHANGE UP (ref 43–77)
PLATELET # BLD AUTO: 311 K/UL — SIGNIFICANT CHANGE UP (ref 150–400)
POTASSIUM SERPL-MCNC: 3.6 MMOL/L — SIGNIFICANT CHANGE UP (ref 3.5–5.3)
POTASSIUM SERPL-SCNC: 3.6 MMOL/L — SIGNIFICANT CHANGE UP (ref 3.5–5.3)
PROT SERPL-MCNC: 6.9 GM/DL — SIGNIFICANT CHANGE UP (ref 6–8.3)
RBC # BLD: 4.94 M/UL — SIGNIFICANT CHANGE UP (ref 4.2–5.8)
RBC # FLD: 12.3 % — SIGNIFICANT CHANGE UP (ref 10.3–14.5)
SODIUM SERPL-SCNC: 141 MMOL/L — SIGNIFICANT CHANGE UP (ref 135–145)
TROPONIN I, HIGH SENSITIVITY RESULT: 4.55 NG/L — SIGNIFICANT CHANGE UP
WBC # BLD: 10.35 K/UL — SIGNIFICANT CHANGE UP (ref 3.8–10.5)
WBC # FLD AUTO: 10.35 K/UL — SIGNIFICANT CHANGE UP (ref 3.8–10.5)

## 2024-04-11 PROCEDURE — 84484 ASSAY OF TROPONIN QUANT: CPT

## 2024-04-11 PROCEDURE — 93005 ELECTROCARDIOGRAM TRACING: CPT

## 2024-04-11 PROCEDURE — 99285 EMERGENCY DEPT VISIT HI MDM: CPT | Mod: 25

## 2024-04-11 PROCEDURE — 85025 COMPLETE CBC W/AUTO DIFF WBC: CPT

## 2024-04-11 PROCEDURE — 36415 COLL VENOUS BLD VENIPUNCTURE: CPT

## 2024-04-11 PROCEDURE — 81003 URINALYSIS AUTO W/O SCOPE: CPT

## 2024-04-11 PROCEDURE — 70450 CT HEAD/BRAIN W/O DYE: CPT | Mod: MC

## 2024-04-11 PROCEDURE — 80053 COMPREHEN METABOLIC PANEL: CPT

## 2024-04-11 PROCEDURE — 99284 EMERGENCY DEPT VISIT MOD MDM: CPT | Mod: 25

## 2024-04-11 RX ORDER — ACETAMINOPHEN 500 MG
650 TABLET ORAL ONCE
Refills: 0 | Status: COMPLETED | OUTPATIENT
Start: 2024-04-11 | End: 2024-04-11

## 2024-04-11 RX ORDER — AMLODIPINE BESYLATE 2.5 MG/1
5 TABLET ORAL ONCE
Refills: 0 | Status: COMPLETED | OUTPATIENT
Start: 2024-04-11 | End: 2024-04-11

## 2024-04-11 NOTE — ED PROVIDER NOTE - CLINICAL SUMMARY MEDICAL DECISION MAKING FREE TEXT BOX
Hx of DM and HTN,on diabetes meds but does not have any meds for HTN.  Now feeling headache, had transient fluctuating elevated blood pressures at home.  Wants evaluation.     EKG, labs, UA, and CT head ordered.  Tylenol for pain and amlodipine initiated for HTN.

## 2024-04-11 NOTE — ED PROVIDER NOTE - CPE EDP NEURO NORM
RN called patient's phone number,  Aguilar answered. Patient is now resting. Patient was having blood sugar issues earlier this afternoon, patient ate and had some rescue glucose and blood sugar came up fine. Shortly after patient mentioned she had some chest pain on the right side of her chest. Patient was given nitro but did not resolve the pain. Aguilar states that patient said the pain was the same as last time she was in the hospital. Patient given tramadol which has relieved the pain. Patient denies any dyspnea, arm pain, weakness, or other symptoms.    RN advised Aguilar to take patient to ED if she has recurring severe chest pain, any dyspnea, or other significant issues. If none this evening, come for TCM appointment tomorrow at 11:30 with PCP.    Aguilar verbalized understanding and agreed with the plan.        normal...

## 2024-04-11 NOTE — ED PROVIDER NOTE - PATIENT PORTAL LINK FT
You can access the FollowMyHealth Patient Portal offered by [facility] by registering at the following website: [email or web address]. By joining [employer]’s FollowMyHealth portal, you will also be able to view your health information using other applications (apps) compatible with our system.

## 2024-04-11 NOTE — ED ADULT TRIAGE NOTE - CHIEF COMPLAINT QUOTE
Patient c/o "not feeling well" not very elaborate even with .  States he wants to see a doctor because he needs BP medication bc it is very low and he can die.

## 2024-04-11 NOTE — ED PROVIDER NOTE - OBJECTIVE STATEMENT
Pt. is a 37 yo M with hx of type 2 DM presents with headache and not feeling well X 1 day.  Patient states he took his blood pressure at home and it has been fluctuating high and low.  Came to the ED as he feels it is unsafe to be so high and he does not have a blood pressure medication.  Denies chest pain, SOB, congestion, vomiting, back pain.  +dull diffuse headache.  No weakness or numbness.

## 2024-04-12 VITALS
HEART RATE: 60 BPM | TEMPERATURE: 98 F | DIASTOLIC BLOOD PRESSURE: 77 MMHG | RESPIRATION RATE: 18 BRPM | SYSTOLIC BLOOD PRESSURE: 113 MMHG | OXYGEN SATURATION: 96 %

## 2024-04-12 LAB
APPEARANCE UR: CLEAR — SIGNIFICANT CHANGE UP
BILIRUB UR-MCNC: NEGATIVE — SIGNIFICANT CHANGE UP
COLOR SPEC: YELLOW — SIGNIFICANT CHANGE UP
DIFF PNL FLD: NEGATIVE — SIGNIFICANT CHANGE UP
GLUCOSE UR QL: NEGATIVE MG/DL — SIGNIFICANT CHANGE UP
KETONES UR-MCNC: NEGATIVE MG/DL — SIGNIFICANT CHANGE UP
LEUKOCYTE ESTERASE UR-ACNC: NEGATIVE — SIGNIFICANT CHANGE UP
NITRITE UR-MCNC: NEGATIVE — SIGNIFICANT CHANGE UP
PH UR: 7 — SIGNIFICANT CHANGE UP (ref 5–8)
PROT UR-MCNC: NEGATIVE MG/DL — SIGNIFICANT CHANGE UP
SP GR SPEC: 1.01 — SIGNIFICANT CHANGE UP (ref 1–1.03)
UROBILINOGEN FLD QL: 0.2 MG/DL — SIGNIFICANT CHANGE UP (ref 0.2–1)

## 2024-04-12 PROCEDURE — 93010 ELECTROCARDIOGRAM REPORT: CPT

## 2024-04-12 PROCEDURE — 70450 CT HEAD/BRAIN W/O DYE: CPT | Mod: 26,MC

## 2024-04-12 RX ORDER — AMLODIPINE BESYLATE 2.5 MG/1
1 TABLET ORAL
Qty: 14 | Refills: 0
Start: 2024-04-12 | End: 2024-04-25

## 2024-04-12 RX ADMIN — AMLODIPINE BESYLATE 5 MILLIGRAM(S): 2.5 TABLET ORAL at 00:35

## 2024-04-12 RX ADMIN — Medication 650 MILLIGRAM(S): at 00:35

## 2024-09-16 NOTE — ED ADULT NURSE NOTE - CINV DISCH TEACH PARTICIP
Subjective:       Patient ID: Shey Cabrera is a 52 y.o. female.    Chief Complaint: Annual Exam   HPI  Gyn: NP Becky hx PCOS 2023 FSH 30   MM/23  Dexa: normal   Colonoscopy:  not yet order brother colon cancer   Immunizations: Flu: no Tdap: check old chart  Pneumovax:  Shingles: Y   Smoker:  Yes  Derm: Dr ANJU Menchaca + hand psoriasis   Get old note      Wellness   Labs   Psych Adderall ER 40 - pharm mixed up   Brain fog is bad   Nail fungus big toe  1 yr - never took meds. Has pick toes     Left axillary hidradenitis inflammation.  Has history of groin adenitis removed surgically.  Started becoming inflamed and red 1 day ago.       Type 2 DM:  elevated fasting glucose.  G 188/  A1c 7.7  // off while due to insurance loss.   Prev Metformin 500 (diarrhea) Synjardy does well. Ozempic 2 bloating,bleaching  (off gyn rx)      Congenital scoliosis:  improved s/p fusion.  - fusion L2-S1. Pain has greatly improved.  Mgmt Dr Krista Storm.       LUDMILA/Anxiety: worse lately off all meds.   Recommend restart Rx Prozac 20   all over the place. panic attacks out of blue trigger is mostly work.  Type A as to be perfect that everything this causes her increased stress.  prev Prozac 20 & Wellburtin 300. Prn xanax mgmt w Cass De La Garza     ADD: mgmt Psych NP restarted recently Rx Adderall XR 20  daily   Trial Azstrys didn't like         Vitamin-D deficiency:  Taking supplement twice weekly   Lowest 22      Metabolic syndrome BMI 35/ 36 & 235, 37/240           ____________________________________________________________________________________________________  Assessment & Plan:  1. Wellness examination  - Microalbumin/Creatinine Ratio, Urine; Future  - Urinalysis  - Comprehensive Metabolic Panel; Future  - Hemoglobin A1C; Future  - Lipid Panel; Future  - Vitamin D; Future  - varicella-zoster gE vac,2 of 2 SusR 0.5 mL    2. DM type 2 with diabetic mixed hyperlipidemia  - tirzepatide (MOUNJARO) 2.5 mg/0.5 mL PnIj; Inject  2.5 mg into the skin every 7 days.  Dispense: 4 Pen; Refill: 0  - empagliflozin-metformin (SYNJARDY XR) 12.5-1,000 mg TBph; Take 1 tablet by mouth once daily. (Patient not taking: Reported on 9/16/2024)  Dispense: 30 tablet; Refill: 6  - Microalbumin/Creatinine Ratio, Urine; Future  - Urinalysis  - Comprehensive Metabolic Panel; Future  - Hemoglobin A1C; Future  - Lipid Panel; Future  - blood-glucose meter kit; To check BG 2 times daily, to use with insurance preferred meter  Dispense: 1 each; Refill: 0  - lancets Misc; To check BG 2 times daily, to use with insurance preferred meter  Dispense: 200 each; Refill: 12  - blood sugar diagnostic Strp; To check BG 2 times daily, to use with insurance preferred meter  Dispense: 200 strip; Refill: 12  - Ambulatory referral/consult to Diabetes Education; Future    3. Nail fungus  - fluconazole (DIFLUCAN) 200 MG Tab; Take 1 tablet (200 mg total) by mouth once a week.  Dispense: 12 tablet; Refill: 1  - ciclopirox (PENLAC) 8 % Soln; Apply topically nightly.  Dispense: 6.6 mL; Refill: 2    4. Vitamin D deficiency  - ergocalciferol (VITAMIN D2) 50,000 unit Cap; Take 1 capsule (50,000 Units total) by mouth every 7 days.  Dispense: 12 capsule; Refill: 2  - Vitamin D; Future    5. Family history of colon cancer  - Case Request Endoscopy: COLONOSCOPY    6. LUDMILA (generalized anxiety disorder)    7. Attention deficit hyperactivity disorder (ADHD), combined type    8. Visit for screening mammogram  - Mammo Digital Screening Bilat w/ Raad; Future    9. Severe obesity (BMI 35.0-39.9) with comorbidity    10. Class 2 severe obesity with serious comorbidity and body mass index (BMI) of 37.0 to 37.9 in adult, unspecified obesity type    11. Screening for colon cancer  - Case Request Endoscopy: COLONOSCOPY     Wellness examination  -     Microalbumin/Creatinine Ratio, Urine; Future; Expected date: 09/16/2024  -     Urinalysis  -     Comprehensive Metabolic Panel; Future; Expected date:  09/16/2024  -     Hemoglobin A1C; Future; Expected date: 09/16/2024  -     Lipid Panel; Future; Expected date: 09/16/2024  -     Vitamin D; Future; Expected date: 09/16/2024  -     varicella-zoster gE vac,2 of 2 SusR 0.5 mL    DM type 2 with diabetic mixed hyperlipidemia  -     tirzepatide (MOUNJARO) 2.5 mg/0.5 mL PnIj; Inject 2.5 mg into the skin every 7 days.  Dispense: 4 Pen; Refill: 0  -     empagliflozin-metformin (SYNJARDY XR) 12.5-1,000 mg TBph; Take 1 tablet by mouth once daily. (Patient not taking: Reported on 9/16/2024)  Dispense: 30 tablet; Refill: 6  -     Microalbumin/Creatinine Ratio, Urine; Future; Expected date: 09/16/2024  -     Urinalysis  -     Comprehensive Metabolic Panel; Future; Expected date: 09/16/2024  -     Hemoglobin A1C; Future; Expected date: 09/16/2024  -     Lipid Panel; Future; Expected date: 09/16/2024  -     blood-glucose meter kit; To check BG 2 times daily, to use with insurance preferred meter  Dispense: 1 each; Refill: 0  -     lancets Misc; To check BG 2 times daily, to use with insurance preferred meter  Dispense: 200 each; Refill: 12  -     blood sugar diagnostic Strp; To check BG 2 times daily, to use with insurance preferred meter  Dispense: 200 strip; Refill: 12  -     Ambulatory referral/consult to Diabetes Education; Future; Expected date: 09/23/2024    Nail fungus  -     fluconazole (DIFLUCAN) 200 MG Tab; Take 1 tablet (200 mg total) by mouth once a week.  Dispense: 12 tablet; Refill: 1  -     ciclopirox (PENLAC) 8 % Soln; Apply topically nightly.  Dispense: 6.6 mL; Refill: 2    Vitamin D deficiency  -     ergocalciferol (VITAMIN D2) 50,000 unit Cap; Take 1 capsule (50,000 Units total) by mouth every 7 days.  Dispense: 12 capsule; Refill: 2  -     Vitamin D; Future; Expected date: 09/16/2024    Family history of colon cancer  -     Case Request Endoscopy: COLONOSCOPY    LUDMILA (generalized anxiety disorder)    Attention deficit hyperactivity disorder (ADHD), combined  type    Visit for screening mammogram  -     Mammo Digital Screening Bilat w/ Raad; Future; Expected date: 09/16/2024    Severe obesity (BMI 35.0-39.9) with comorbidity    Class 2 severe obesity with serious comorbidity and body mass index (BMI) of 37.0 to 37.9 in adult, unspecified obesity type    Screening for colon cancer  -     Case Request Endoscopy: COLONOSCOPY        Continue to work on maintain healthy weight, balanced diet. Avoid unhealthy habits: smoking/vaping, excessive alcohol intake.     Recommend diet exercise:  High protein, low fat, low carb diet - calories women under <1200 & men <1800, carbs <100 G, protein 50-60 G daily. Protein supplements to replace one meal.  Keep all beverages < 10 calories per serving. Keep snacks < 100 calories.   Recommend exercise 160 minutes per week, combo of cardio and weight/strength training.     Disclaimer: This note was partly generated using dictation software which may occasionally result in transcription errors  ____________________________________________________________________________________________________  Review of Systems:  Review of Systems   Constitutional:  Positive for fatigue. Negative for appetite change.   HENT:  Negative for nosebleeds.    Eyes:  Negative for pain.   Respiratory:  Negative for choking.    Cardiovascular:  Negative for chest pain.   Gastrointestinal:  Negative for blood in stool.   Genitourinary:  Negative for hematuria.   Musculoskeletal:  Positive for arthralgias. Negative for joint swelling.   Skin:  Positive for rash. Negative for pallor.   Neurological:  Negative for facial asymmetry.   Hematological:  Does not bruise/bleed easily.   Psychiatric/Behavioral:  Negative for confusion. The patient is nervous/anxious.            Objective:     Wt Readings from Last 3 Encounters:   09/16/24 109 kg (240 lb 4.8 oz)   07/06/23 106.9 kg (235 lb 12.5 oz)   02/01/23 103.7 kg (228 lb 9.9 oz)     BP Readings from Last 3 Encounters:  "  09/16/24 104/78   07/06/23 132/84   02/01/23 128/76       Lab Results   Component Value Date    WBC 8.04 09/13/2024    HGB 14.6 09/13/2024    HCT 44.0 09/13/2024     09/13/2024     09/13/2024    K 4.0 09/13/2024     09/13/2024    ALT 31 09/13/2024    AST 19 09/13/2024    CO2 20 (L) 09/13/2024    CREATININE 0.8 09/13/2024    BUN 10 09/13/2024     (H) 09/13/2024      Hemoglobin A1C   Date Value Ref Range Status   09/13/2024 7.7 (H) 4.0 - 5.6 % Final     Comment:     ADA Screening Guidelines:  5.7-6.4%  Consistent with prediabetes  >or=6.5%  Consistent with diabetes    High levels of fetal hemoglobin interfere with the HbA1C  assay. Heterozygous hemoglobin variants (HbS, HgC, etc)do  not significantly interfere with this assay.   However, presence of multiple variants may affect accuracy.     07/05/2023 5.7 (H) 4.0 - 5.6 % Final     Comment:     ADA Screening Guidelines:  5.7-6.4%  Consistent with prediabetes  >or=6.5%  Consistent with diabetes    High levels of fetal hemoglobin interfere with the HbA1C  assay. Heterozygous hemoglobin variants (HbS, HgC, etc)do  not significantly interfere with this assay.   However, presence of multiple variants may affect accuracy.        Lab Results   Component Value Date    TSH 3.532 09/13/2024    TSH 2.995 07/05/2023     No results found for: "FREET4"  Lab Results   Component Value Date    LDLCALC 110.2 09/13/2024    LDLCALC 85.8 07/05/2023     Lab Results   Component Value Date    TRIG 119 09/13/2024    TRIG 206 (H) 07/05/2023            Physical Exam      Constitutional:       Appearance: Normal appearance.   HENT:      Head: Normocephalic and atraumatic.   Eyes:      Extraocular Movements: Extraocular movements intact.      Pupils: Pupils are equal, round, and reactive to light.   Cardiovascular:      Rate and Rhythm: Normal rate.   Pulmonary:      Effort: Pulmonary effort is normal.   Neurological:      Mental Status: She is alert and oriented to " Patient person, place, and time.   Psychiatric:         Mood and Affect: Mood normal.     Medication List with Changes/Refills   New Medications    BLOOD SUGAR DIAGNOSTIC STRP    To check BG 2 times daily, to use with insurance preferred meter    BLOOD-GLUCOSE METER KIT    To check BG 2 times daily, to use with insurance preferred meter    CICLOPIROX (PENLAC) 8 % SOLN    Apply topically nightly.    ERGOCALCIFEROL (VITAMIN D2) 50,000 UNIT CAP    Take 1 capsule (50,000 Units total) by mouth every 7 days.    FLUCONAZOLE (DIFLUCAN) 200 MG TAB    Take 1 tablet (200 mg total) by mouth once a week.    LANCETS MISC    To check BG 2 times daily, to use with insurance preferred meter    TIRZEPATIDE (MOUNJARO) 2.5 MG/0.5 ML PNIJ    Inject 2.5 mg into the skin every 7 days.   Current Medications    ALPRAZOLAM (XANAX) 0.5 MG TABLET    Take 0.5 mg by mouth daily as needed.    DEXTROAMPHETAMINE-AMPHETAMINE (ADDERALL XR) 20 MG 24 HR CAPSULE    Take 40 mg by mouth every morning.    FLUOXETINE 20 MG CAPSULE    Take 10 mg by mouth once daily.    HALOBETASOL PROPION-TAZAROTENE (DUOBRII) 0.01-0.045 % LOTN    Apply topically.   Changed and/or Refilled Medications    Modified Medication Previous Medication    EMPAGLIFLOZIN-METFORMIN (SYNJARDY XR) 12.5-1,000 MG TBPH empagliflozin-metformin (SYNJARDY XR) 12.5-1,000 mg TBph       Take 1 tablet by mouth once daily.    Take 1 tablet by mouth once daily.   Discontinued Medications    BUPROPION (WELLBUTRIN XL) 300 MG 24 HR TABLET    Take 300 mg by mouth once daily.     ONDANSETRON (ZOFRAN) 4 MG TABLET    Take 2 tablets (8 mg total) by mouth every 6 (six) hours as needed for Nausea.    PROMETHAZINE (PHENERGAN) 25 MG TABLET    Take 1 tablet (25 mg total) by mouth every 6 (six) hours as needed for Nausea.